# Patient Record
Sex: FEMALE | Race: WHITE | NOT HISPANIC OR LATINO
[De-identification: names, ages, dates, MRNs, and addresses within clinical notes are randomized per-mention and may not be internally consistent; named-entity substitution may affect disease eponyms.]

---

## 2018-10-04 ENCOUNTER — TRANSCRIPTION ENCOUNTER (OUTPATIENT)
Age: 33
End: 2018-10-04

## 2019-06-22 ENCOUNTER — TRANSCRIPTION ENCOUNTER (OUTPATIENT)
Age: 34
End: 2019-06-22

## 2019-09-12 PROBLEM — Z00.00 ENCOUNTER FOR PREVENTIVE HEALTH EXAMINATION: Status: ACTIVE | Noted: 2019-09-12

## 2019-09-17 ENCOUNTER — APPOINTMENT (OUTPATIENT)
Dept: MATERNAL FETAL MEDICINE | Facility: CLINIC | Age: 34
End: 2019-09-17
Payer: COMMERCIAL

## 2019-09-17 ENCOUNTER — ASOB RESULT (OUTPATIENT)
Age: 34
End: 2019-09-17

## 2019-09-17 DIAGNOSIS — O99.810 ABNORMAL GLUCOSE COMPLICATING PREGNANCY: ICD-10-CM

## 2019-09-17 PROCEDURE — G0109 DIAB MANAGE TRN IND/GROUP: CPT

## 2019-09-18 ENCOUNTER — OTHER (OUTPATIENT)
Age: 34
End: 2019-09-18

## 2019-09-18 ENCOUNTER — APPOINTMENT (OUTPATIENT)
Dept: MATERNAL FETAL MEDICINE | Facility: CLINIC | Age: 34
End: 2019-09-18
Payer: COMMERCIAL

## 2019-09-18 PROCEDURE — G0109 DIAB MANAGE TRN IND/GROUP: CPT

## 2019-09-18 RX ORDER — URINE ACETONE TEST STRIPS
STRIP MISCELLANEOUS
Qty: 1 | Refills: 1 | Status: ACTIVE | COMMUNITY
Start: 2019-09-17 | End: 1900-01-01

## 2019-09-18 RX ORDER — BLOOD-GLUCOSE METER
W/DEVICE KIT MISCELLANEOUS
Qty: 1 | Refills: 0 | Status: ACTIVE | COMMUNITY
Start: 2019-09-17 | End: 1900-01-01

## 2019-09-18 RX ORDER — LANCETS 33 GAUGE
EACH MISCELLANEOUS
Qty: 6 | Refills: 1 | Status: ACTIVE | COMMUNITY
Start: 2019-09-17 | End: 1900-01-01

## 2019-09-18 RX ORDER — BLOOD-GLUCOSE METER
KIT MISCELLANEOUS
Qty: 6 | Refills: 1 | Status: ACTIVE | COMMUNITY
Start: 2019-09-17 | End: 1900-01-01

## 2019-09-19 RX ORDER — BLOOD-GLUCOSE METER
W/DEVICE EACH MISCELLANEOUS
Qty: 1 | Refills: 0 | Status: ACTIVE | COMMUNITY
Start: 2019-09-19 | End: 1900-01-01

## 2019-09-19 RX ORDER — LANCETS
EACH MISCELLANEOUS
Qty: 2 | Refills: 3 | Status: COMPLETED | COMMUNITY
Start: 2019-09-19 | End: 2020-09-19

## 2019-09-19 RX ORDER — BLOOD SUGAR DIAGNOSTIC
STRIP MISCELLANEOUS
Qty: 2 | Refills: 3 | Status: COMPLETED | COMMUNITY
Start: 2019-09-19 | End: 2020-09-19

## 2019-09-19 RX ORDER — URINE GLUCOSE-ACET TEST STRIP
STRIP MISCELLANEOUS
Qty: 1 | Refills: 0 | Status: ACTIVE | COMMUNITY
Start: 2019-09-19 | End: 1900-01-01

## 2019-09-24 ENCOUNTER — ASOB RESULT (OUTPATIENT)
Age: 34
End: 2019-09-24

## 2019-09-24 ENCOUNTER — APPOINTMENT (OUTPATIENT)
Dept: MATERNAL FETAL MEDICINE | Facility: CLINIC | Age: 34
End: 2019-09-24
Payer: COMMERCIAL

## 2019-09-24 PROCEDURE — G0108 DIAB MANAGE TRN  PER INDIV: CPT

## 2019-10-02 ENCOUNTER — TRANSCRIPTION ENCOUNTER (OUTPATIENT)
Age: 34
End: 2019-10-02

## 2019-10-04 ENCOUNTER — APPOINTMENT (OUTPATIENT)
Dept: MATERNAL FETAL MEDICINE | Facility: CLINIC | Age: 34
End: 2019-10-04

## 2019-11-29 ENCOUNTER — TRANSCRIPTION ENCOUNTER (OUTPATIENT)
Age: 34
End: 2019-11-29

## 2019-12-05 ENCOUNTER — OUTPATIENT (OUTPATIENT)
Dept: OUTPATIENT SERVICES | Facility: HOSPITAL | Age: 34
LOS: 1 days | End: 2019-12-05
Payer: COMMERCIAL

## 2019-12-05 DIAGNOSIS — O34.219 MATERNAL CARE FOR UNSPECIFIED TYPE SCAR FROM PREVIOUS CESAREAN DELIVERY: ICD-10-CM

## 2019-12-05 DIAGNOSIS — Z01.818 ENCOUNTER FOR OTHER PREPROCEDURAL EXAMINATION: ICD-10-CM

## 2019-12-05 LAB
ANION GAP SERPL CALC-SCNC: 13 MMOL/L — SIGNIFICANT CHANGE UP (ref 5–17)
BLD GP AB SCN SERPL QL: NEGATIVE — SIGNIFICANT CHANGE UP
BUN SERPL-MCNC: 12 MG/DL — SIGNIFICANT CHANGE UP (ref 7–23)
CALCIUM SERPL-MCNC: 8.8 MG/DL — SIGNIFICANT CHANGE UP (ref 8.4–10.5)
CHLORIDE SERPL-SCNC: 100 MMOL/L — SIGNIFICANT CHANGE UP (ref 96–108)
CO2 SERPL-SCNC: 22 MMOL/L — SIGNIFICANT CHANGE UP (ref 22–31)
CREAT SERPL-MCNC: 0.76 MG/DL — SIGNIFICANT CHANGE UP (ref 0.5–1.3)
GLUCOSE SERPL-MCNC: 85 MG/DL — SIGNIFICANT CHANGE UP (ref 70–99)
HCT VFR BLD CALC: 34.1 % — LOW (ref 34.5–45)
HGB BLD-MCNC: 11.4 G/DL — LOW (ref 11.5–15.5)
MCHC RBC-ENTMCNC: 30.3 PG — SIGNIFICANT CHANGE UP (ref 27–34)
MCHC RBC-ENTMCNC: 33.4 GM/DL — SIGNIFICANT CHANGE UP (ref 32–36)
MCV RBC AUTO: 90.7 FL — SIGNIFICANT CHANGE UP (ref 80–100)
PLATELET # BLD AUTO: 287 K/UL — SIGNIFICANT CHANGE UP (ref 150–400)
POTASSIUM SERPL-MCNC: 4 MMOL/L — SIGNIFICANT CHANGE UP (ref 3.5–5.3)
POTASSIUM SERPL-SCNC: 4 MMOL/L — SIGNIFICANT CHANGE UP (ref 3.5–5.3)
RBC # BLD: 3.76 M/UL — LOW (ref 3.8–5.2)
RBC # FLD: 11.9 % — SIGNIFICANT CHANGE UP (ref 10.3–14.5)
RH IG SCN BLD-IMP: POSITIVE — SIGNIFICANT CHANGE UP
SODIUM SERPL-SCNC: 135 MMOL/L — SIGNIFICANT CHANGE UP (ref 135–145)
WBC # BLD: 9.71 K/UL — SIGNIFICANT CHANGE UP (ref 3.8–10.5)
WBC # FLD AUTO: 9.71 K/UL — SIGNIFICANT CHANGE UP (ref 3.8–10.5)

## 2019-12-05 PROCEDURE — 80048 BASIC METABOLIC PNL TOTAL CA: CPT

## 2019-12-05 PROCEDURE — 86900 BLOOD TYPING SEROLOGIC ABO: CPT

## 2019-12-05 PROCEDURE — 86901 BLOOD TYPING SEROLOGIC RH(D): CPT

## 2019-12-05 PROCEDURE — 86850 RBC ANTIBODY SCREEN: CPT

## 2019-12-05 PROCEDURE — 85027 COMPLETE CBC AUTOMATED: CPT

## 2019-12-05 PROCEDURE — G0463: CPT

## 2019-12-11 ENCOUNTER — TRANSCRIPTION ENCOUNTER (OUTPATIENT)
Age: 34
End: 2019-12-11

## 2019-12-12 ENCOUNTER — INPATIENT (INPATIENT)
Facility: HOSPITAL | Age: 34
LOS: 2 days | Discharge: HOME | End: 2019-12-15
Attending: OBSTETRICS & GYNECOLOGY | Admitting: OBSTETRICS & GYNECOLOGY
Payer: COMMERCIAL

## 2019-12-12 VITALS — WEIGHT: 147.71 LBS | HEIGHT: 66 IN

## 2019-12-12 DIAGNOSIS — O34.219 MATERNAL CARE FOR UNSPECIFIED TYPE SCAR FROM PREVIOUS CESAREAN DELIVERY: ICD-10-CM

## 2019-12-12 LAB
BLD GP AB SCN SERPL QL: NEGATIVE — SIGNIFICANT CHANGE UP
GLUCOSE BLDC GLUCOMTR-MCNC: 82 MG/DL — SIGNIFICANT CHANGE UP (ref 70–99)
RH IG SCN BLD-IMP: POSITIVE — SIGNIFICANT CHANGE UP
T PALLIDUM AB TITR SER: NEGATIVE — SIGNIFICANT CHANGE UP

## 2019-12-12 PROCEDURE — 59514 CESAREAN DELIVERY ONLY: CPT | Mod: AS,U9

## 2019-12-12 RX ORDER — OXYCODONE HYDROCHLORIDE 5 MG/1
5 TABLET ORAL ONCE
Refills: 0 | Status: DISCONTINUED | OUTPATIENT
Start: 2019-12-12 | End: 2019-12-15

## 2019-12-12 RX ORDER — FAMOTIDINE 10 MG/ML
20 INJECTION INTRAVENOUS ONCE
Refills: 0 | Status: COMPLETED | OUTPATIENT
Start: 2019-12-12 | End: 2019-12-12

## 2019-12-12 RX ORDER — ACETAMINOPHEN 500 MG
975 TABLET ORAL
Refills: 0 | Status: DISCONTINUED | OUTPATIENT
Start: 2019-12-12 | End: 2019-12-15

## 2019-12-12 RX ORDER — OXYTOCIN 10 UNIT/ML
333.33 VIAL (ML) INJECTION
Qty: 20 | Refills: 0 | Status: DISCONTINUED | OUTPATIENT
Start: 2019-12-12 | End: 2019-12-12

## 2019-12-12 RX ORDER — SODIUM CHLORIDE 9 MG/ML
1000 INJECTION, SOLUTION INTRAVENOUS
Refills: 0 | Status: DISCONTINUED | OUTPATIENT
Start: 2019-12-12 | End: 2019-12-15

## 2019-12-12 RX ORDER — OXYCODONE HYDROCHLORIDE 5 MG/1
5 TABLET ORAL
Refills: 0 | Status: DISCONTINUED | OUTPATIENT
Start: 2019-12-12 | End: 2019-12-15

## 2019-12-12 RX ORDER — DEXAMETHASONE 0.5 MG/5ML
4 ELIXIR ORAL EVERY 6 HOURS
Refills: 0 | Status: DISCONTINUED | OUTPATIENT
Start: 2019-12-12 | End: 2019-12-13

## 2019-12-12 RX ORDER — METOCLOPRAMIDE HCL 10 MG
10 TABLET ORAL ONCE
Refills: 0 | Status: DISCONTINUED | OUTPATIENT
Start: 2019-12-12 | End: 2019-12-12

## 2019-12-12 RX ORDER — NALOXONE HYDROCHLORIDE 4 MG/.1ML
0.1 SPRAY NASAL
Refills: 0 | Status: DISCONTINUED | OUTPATIENT
Start: 2019-12-12 | End: 2019-12-13

## 2019-12-12 RX ORDER — CHLORHEXIDINE GLUCONATE 213 G/1000ML
1 SOLUTION TOPICAL ONCE
Refills: 0 | Status: COMPLETED | OUTPATIENT
Start: 2019-12-12 | End: 2019-12-12

## 2019-12-12 RX ORDER — DIPHENHYDRAMINE HCL 50 MG
25 CAPSULE ORAL EVERY 6 HOURS
Refills: 0 | Status: DISCONTINUED | OUTPATIENT
Start: 2019-12-12 | End: 2019-12-15

## 2019-12-12 RX ORDER — MAGNESIUM HYDROXIDE 400 MG/1
30 TABLET, CHEWABLE ORAL
Refills: 0 | Status: DISCONTINUED | OUTPATIENT
Start: 2019-12-12 | End: 2019-12-15

## 2019-12-12 RX ORDER — TETANUS TOXOID, REDUCED DIPHTHERIA TOXOID AND ACELLULAR PERTUSSIS VACCINE, ADSORBED 5; 2.5; 8; 8; 2.5 [IU]/.5ML; [IU]/.5ML; UG/.5ML; UG/.5ML; UG/.5ML
0.5 SUSPENSION INTRAMUSCULAR ONCE
Refills: 0 | Status: DISCONTINUED | OUTPATIENT
Start: 2019-12-12 | End: 2019-12-15

## 2019-12-12 RX ORDER — ONDANSETRON 8 MG/1
4 TABLET, FILM COATED ORAL EVERY 6 HOURS
Refills: 0 | Status: DISCONTINUED | OUTPATIENT
Start: 2019-12-12 | End: 2019-12-13

## 2019-12-12 RX ORDER — FENTANYL/BUPIVACAINE/NS/PF 2MCG/ML-.1
5 PLASTIC BAG, INJECTION (ML) INJECTION
Refills: 0 | Status: DISCONTINUED | OUTPATIENT
Start: 2019-12-12 | End: 2019-12-13

## 2019-12-12 RX ORDER — CITRIC ACID/SODIUM CITRATE 300-500 MG
15 SOLUTION, ORAL ORAL ONCE
Refills: 0 | Status: COMPLETED | OUTPATIENT
Start: 2019-12-12 | End: 2019-12-12

## 2019-12-12 RX ORDER — FENTANYL/BUPIVACAINE/NS/PF 2MCG/ML-.1
250 PLASTIC BAG, INJECTION (ML) INJECTION
Refills: 0 | Status: DISCONTINUED | OUTPATIENT
Start: 2019-12-12 | End: 2019-12-13

## 2019-12-12 RX ORDER — SIMETHICONE 80 MG/1
80 TABLET, CHEWABLE ORAL EVERY 4 HOURS
Refills: 0 | Status: DISCONTINUED | OUTPATIENT
Start: 2019-12-12 | End: 2019-12-15

## 2019-12-12 RX ORDER — SODIUM CHLORIDE 9 MG/ML
1000 INJECTION, SOLUTION INTRAVENOUS ONCE
Refills: 0 | Status: COMPLETED | OUTPATIENT
Start: 2019-12-12 | End: 2019-12-12

## 2019-12-12 RX ORDER — KETOROLAC TROMETHAMINE 30 MG/ML
30 SYRINGE (ML) INJECTION EVERY 6 HOURS
Refills: 0 | Status: DISCONTINUED | OUTPATIENT
Start: 2019-12-12 | End: 2019-12-14

## 2019-12-12 RX ORDER — OXYTOCIN 10 UNIT/ML
333.33 VIAL (ML) INJECTION
Qty: 20 | Refills: 0 | Status: DISCONTINUED | OUTPATIENT
Start: 2019-12-12 | End: 2019-12-15

## 2019-12-12 RX ORDER — CEFAZOLIN SODIUM 1 G
2000 VIAL (EA) INJECTION ONCE
Refills: 0 | Status: DISCONTINUED | OUTPATIENT
Start: 2019-12-12 | End: 2019-12-12

## 2019-12-12 RX ORDER — GLYCERIN ADULT
1 SUPPOSITORY, RECTAL RECTAL AT BEDTIME
Refills: 0 | Status: DISCONTINUED | OUTPATIENT
Start: 2019-12-12 | End: 2019-12-15

## 2019-12-12 RX ORDER — LANOLIN
1 OINTMENT (GRAM) TOPICAL EVERY 6 HOURS
Refills: 0 | Status: DISCONTINUED | OUTPATIENT
Start: 2019-12-12 | End: 2019-12-15

## 2019-12-12 RX ORDER — IBUPROFEN 200 MG
600 TABLET ORAL EVERY 6 HOURS
Refills: 0 | Status: COMPLETED | OUTPATIENT
Start: 2019-12-12 | End: 2020-11-09

## 2019-12-12 RX ORDER — HEPARIN SODIUM 5000 [USP'U]/ML
5000 INJECTION INTRAVENOUS; SUBCUTANEOUS EVERY 12 HOURS
Refills: 0 | Status: DISCONTINUED | OUTPATIENT
Start: 2019-12-12 | End: 2019-12-15

## 2019-12-12 RX ORDER — SODIUM CHLORIDE 9 MG/ML
1000 INJECTION, SOLUTION INTRAVENOUS
Refills: 0 | Status: DISCONTINUED | OUTPATIENT
Start: 2019-12-12 | End: 2019-12-12

## 2019-12-12 RX ADMIN — Medication 1000 MILLIUNIT(S)/MIN: at 14:30

## 2019-12-12 RX ADMIN — Medication 250 MILLILITER(S): at 16:05

## 2019-12-12 RX ADMIN — CHLORHEXIDINE GLUCONATE 1 APPLICATION(S): 213 SOLUTION TOPICAL at 10:00

## 2019-12-12 RX ADMIN — Medication 975 MILLIGRAM(S): at 21:36

## 2019-12-12 RX ADMIN — Medication 975 MILLIGRAM(S): at 22:28

## 2019-12-12 RX ADMIN — Medication 30 MILLIGRAM(S): at 12:45

## 2019-12-12 RX ADMIN — HEPARIN SODIUM 5000 UNIT(S): 5000 INJECTION INTRAVENOUS; SUBCUTANEOUS at 18:51

## 2019-12-12 RX ADMIN — FAMOTIDINE 20 MILLIGRAM(S): 10 INJECTION INTRAVENOUS at 11:09

## 2019-12-12 RX ADMIN — Medication 30 MILLIGRAM(S): at 18:51

## 2019-12-12 RX ADMIN — SODIUM CHLORIDE 2000 MILLILITER(S): 9 INJECTION, SOLUTION INTRAVENOUS at 10:20

## 2019-12-12 RX ADMIN — Medication 250 MILLILITER(S): at 13:33

## 2019-12-12 RX ADMIN — Medication 250 MILLILITER(S): at 19:18

## 2019-12-12 RX ADMIN — SODIUM CHLORIDE 125 MILLILITER(S): 9 INJECTION, SOLUTION INTRAVENOUS at 14:31

## 2019-12-12 RX ADMIN — Medication 100 MILLIGRAM(S): at 11:51

## 2019-12-12 RX ADMIN — Medication 15 MILLILITER(S): at 11:09

## 2019-12-12 NOTE — PRE-ANESTHESIA EVALUATION ADULT - NSANTHPMHFT_GEN_ALL_CORE
initial  due to placenta previa. +Gestational DM on insulin 10u qHS (Took 8u last nightl; FSG-82). No significant PMHx.

## 2019-12-12 NOTE — PRE-ANESTHESIA EVALUATION ADULT - NSANTHOSAYNRD_GEN_A_CORE
No. [place] screening performed.  STOP BANG Legend: 0-2 = LOW Risk; 3-4 = INTERMEDIATE Risk; 5-8 = HIGH Risk

## 2019-12-13 LAB
BASOPHILS # BLD AUTO: 0.04 K/UL — SIGNIFICANT CHANGE UP (ref 0–0.2)
BASOPHILS NFR BLD AUTO: 0.3 % — SIGNIFICANT CHANGE UP (ref 0–2)
EOSINOPHIL # BLD AUTO: 0.02 K/UL — SIGNIFICANT CHANGE UP (ref 0–0.5)
EOSINOPHIL NFR BLD AUTO: 0.2 % — SIGNIFICANT CHANGE UP (ref 0–6)
HCT VFR BLD CALC: 26.3 % — LOW (ref 34.5–45)
HGB BLD-MCNC: 8.3 G/DL — LOW (ref 11.5–15.5)
IMM GRANULOCYTES NFR BLD AUTO: 0.5 % — SIGNIFICANT CHANGE UP (ref 0–1.5)
LYMPHOCYTES # BLD AUTO: 18.8 % — SIGNIFICANT CHANGE UP (ref 13–44)
LYMPHOCYTES # BLD AUTO: 2.42 K/UL — SIGNIFICANT CHANGE UP (ref 1–3.3)
MCHC RBC-ENTMCNC: 29 PG — SIGNIFICANT CHANGE UP (ref 27–34)
MCHC RBC-ENTMCNC: 31.6 GM/DL — LOW (ref 32–36)
MCV RBC AUTO: 92 FL — SIGNIFICANT CHANGE UP (ref 80–100)
MONOCYTES # BLD AUTO: 0.95 K/UL — HIGH (ref 0–0.9)
MONOCYTES NFR BLD AUTO: 7.4 % — SIGNIFICANT CHANGE UP (ref 2–14)
NEUTROPHILS # BLD AUTO: 9.34 K/UL — HIGH (ref 1.8–7.4)
NEUTROPHILS NFR BLD AUTO: 72.8 % — SIGNIFICANT CHANGE UP (ref 43–77)
PLATELET # BLD AUTO: 243 K/UL — SIGNIFICANT CHANGE UP (ref 150–400)
RBC # BLD: 2.86 M/UL — LOW (ref 3.8–5.2)
RBC # FLD: 12.1 % — SIGNIFICANT CHANGE UP (ref 10.3–14.5)
WBC # BLD: 12.84 K/UL — HIGH (ref 3.8–10.5)
WBC # FLD AUTO: 12.84 K/UL — HIGH (ref 3.8–10.5)

## 2019-12-13 RX ORDER — ASCORBIC ACID 60 MG
500 TABLET,CHEWABLE ORAL THREE TIMES A DAY
Refills: 0 | Status: DISCONTINUED | OUTPATIENT
Start: 2019-12-13 | End: 2019-12-15

## 2019-12-13 RX ORDER — ONDANSETRON 8 MG/1
4 TABLET, FILM COATED ORAL ONCE
Refills: 0 | Status: COMPLETED | OUTPATIENT
Start: 2019-12-13 | End: 2019-12-13

## 2019-12-13 RX ORDER — FERROUS SULFATE 325(65) MG
325 TABLET ORAL THREE TIMES A DAY
Refills: 0 | Status: DISCONTINUED | OUTPATIENT
Start: 2019-12-13 | End: 2019-12-15

## 2019-12-13 RX ADMIN — Medication 30 MILLIGRAM(S): at 01:10

## 2019-12-13 RX ADMIN — Medication 30 MILLIGRAM(S): at 12:30

## 2019-12-13 RX ADMIN — Medication 975 MILLIGRAM(S): at 09:02

## 2019-12-13 RX ADMIN — Medication 975 MILLIGRAM(S): at 20:03

## 2019-12-13 RX ADMIN — Medication 975 MILLIGRAM(S): at 03:07

## 2019-12-13 RX ADMIN — Medication 30 MILLIGRAM(S): at 12:00

## 2019-12-13 RX ADMIN — HEPARIN SODIUM 5000 UNIT(S): 5000 INJECTION INTRAVENOUS; SUBCUTANEOUS at 18:07

## 2019-12-13 RX ADMIN — Medication 30 MILLIGRAM(S): at 05:45

## 2019-12-13 RX ADMIN — Medication 250 MILLILITER(S): at 05:22

## 2019-12-13 RX ADMIN — Medication 500 MILLIGRAM(S): at 20:03

## 2019-12-13 RX ADMIN — Medication 500 MILLIGRAM(S): at 12:00

## 2019-12-13 RX ADMIN — Medication 30 MILLIGRAM(S): at 23:00

## 2019-12-13 RX ADMIN — Medication 30 MILLIGRAM(S): at 23:20

## 2019-12-13 RX ADMIN — Medication 975 MILLIGRAM(S): at 09:35

## 2019-12-13 RX ADMIN — Medication 30 MILLIGRAM(S): at 18:07

## 2019-12-13 RX ADMIN — Medication 325 MILLIGRAM(S): at 20:03

## 2019-12-13 RX ADMIN — ONDANSETRON 4 MILLIGRAM(S): 8 TABLET, FILM COATED ORAL at 14:39

## 2019-12-13 RX ADMIN — Medication 975 MILLIGRAM(S): at 21:00

## 2019-12-13 RX ADMIN — HEPARIN SODIUM 5000 UNIT(S): 5000 INJECTION INTRAVENOUS; SUBCUTANEOUS at 05:21

## 2019-12-13 RX ADMIN — Medication 30 MILLIGRAM(S): at 00:47

## 2019-12-13 RX ADMIN — Medication 30 MILLIGRAM(S): at 05:21

## 2019-12-13 RX ADMIN — Medication 325 MILLIGRAM(S): at 12:00

## 2019-12-13 RX ADMIN — Medication 975 MILLIGRAM(S): at 04:00

## 2019-12-13 RX ADMIN — Medication 30 MILLIGRAM(S): at 18:30

## 2019-12-13 NOTE — CHART NOTE - NSCHARTNOTEFT_GEN_A_CORE
PA NOTE       POD#1         Vital Signs Last 24 Hrs  T(C): 36.7 (13 Dec 2019 05:30), Max: 36.8 (12 Dec 2019 17:05)  T(F): 98.1 (13 Dec 2019 05:30), Max: 98.3 (12 Dec 2019 17:05)  HR: 64 (13 Dec 2019 05:30) (48 - 78)  BP: 111/66 (13 Dec 2019 05:30) (102/61 - 126/77)  BP(mean): 76 (12 Dec 2019 13:44) (76 - 84)  RR: 18 (13 Dec 2019 05:30) (14 - 24)  SpO2: 96% (13 Dec 2019 05:30) (96% - 100%)               8.3    12.84 )-----------( 243      ( 12-13 @ 08:31 )             26.3           Assessment: Anemia secondary to acute blood loss due to delivery    Plan:  - Ferrous Sulfate, Vitamin C supplementation.  - Monitor for signs/symptoms of anemia.   - Does not require transfusion at this time

## 2019-12-13 NOTE — PROGRESS NOTE ADULT - SUBJECTIVE AND OBJECTIVE BOX
Day 1 of Anesthesia Pain Management Service    SUBJECTIVE: I'm okay  Pain Scale Score:    [X] Refer to charted pain scores    THERAPY: Epidural Bupivacaine 0.01 % and Fentanyl 3 micrograms/mL     Demand Dose: 3 mL  Lockout: 15 minutes   Continuous Rate:  10 mL    MEDICATIONS  (STANDING):  acetaminophen   Tablet .. 975 milliGRAM(s) Oral <User Schedule>  ascorbic acid 500 milliGRAM(s) Oral three times a day  diphtheria/tetanus/pertussis (acellular) Vaccine (ADAcel) 0.5 milliLiter(s) IntraMuscular once  fentanyl (3 MICROgram(s)/mL) + bupivacaine 0.01% in 0.9% Sodium Chloride PCEA 250 milliLiter(s) Epidural PCA Continuous  ferrous    sulfate 325 milliGRAM(s) Oral three times a day  heparin  Injectable 5000 Unit(s) SubCutaneous every 12 hours  ibuprofen  Tablet. 600 milliGRAM(s) Oral every 6 hours  ketorolac   Injectable 30 milliGRAM(s) IV Push every 6 hours  lactated ringers. 1000 milliLiter(s) (125 mL/Hr) IV Continuous <Continuous>  oxytocin Infusion 333.333 milliUNIT(s)/Min (1000 mL/Hr) IV Continuous <Continuous>    MEDICATIONS  (PRN):  dexAMETHasone  Injectable 4 milliGRAM(s) IV Push every 6 hours PRN Nausea  diphenhydrAMINE 25 milliGRAM(s) Oral every 6 hours PRN Itching  fentanyl (3 MICROgram(s)/mL) + bupivacaine 0.01% in 0.9% Sodium Chloride PCEA Rescue Clinician Bolus 5 milliLiter(s) Epidural every 15 minutes PRN Moderate Pain (4 - 6)  glycerin Suppository - Adult 1 Suppository(s) Rectal at bedtime PRN Constipation  lanolin Ointment 1 Application(s) Topical every 6 hours PRN Sore Nipples  magnesium hydroxide Suspension 30 milliLiter(s) Oral two times a day PRN Constipation  naloxone Injectable 0.1 milliGRAM(s) IV Push every 3 minutes PRN For ANY of the following changes in patient status:  A. RR LESS THAN 10 breaths per minute, B. Oxygen saturation LESS THAN 90%, C. Sedation score of 6  ondansetron Injectable 4 milliGRAM(s) IV Push every 6 hours PRN Nausea  oxyCODONE    IR 5 milliGRAM(s) Oral every 3 hours PRN Moderate to Severe Pain (4-10)  oxyCODONE    IR 5 milliGRAM(s) Oral once PRN Moderate to Severe Pain (4-10)  simethicone 80 milliGRAM(s) Chew every 4 hours PRN Gas      OBJECTIVE:    Assessment of Epidural Catheter Site: 	    [X] Dressing intact	[X] Site non-tender	[X] Site without erythema, discharge, edema  [X] Epidural tubing and connection checked	[X] Gross neurological exam within normal limits  [ ] Catheter removed – tip intact		                          8.3    12.84 )-----------( 243      ( 13 Dec 2019 08:31 )             26.3     Vital Signs Last 24 Hrs  T(C): 36.7 (12-13-19 @ 09:00), Max: 36.8 (12-12-19 @ 17:05)  T(F): 98.1 (12-13-19 @ 09:00), Max: 98.3 (12-12-19 @ 17:05)  HR: 58 (12-13-19 @ 09:00) (48 - 78)  BP: 103/67 (12-13-19 @ 09:00) (102/61 - 126/77)  BP(mean): 76 (12-12-19 @ 13:44) (76 - 84)  RR: 18 (12-13-19 @ 09:00) (14 - 24)  SpO2: 98% (12-13-19 @ 09:00) (96% - 100%)      Sedation Score:	[X] Alert	[ ] Drowsy	[ ] Arousable  [ ] Asleep  [ ] Unresponsive    Side Effects:	[X] None	[ ] Nausea	[ ] Vomiting  [ ] Pruritus  		[ ] Weakness  [ ] Numbness  [ ] Other:    ASSESSMENT/ PLAN:    Therapy:                         [X] Continue   [ ] Discontinue   [ ] Change to PRN Analgesics    Documentation and Verification of current medications:  [X] Done	[ ] Not done, not eligible, reason:    Comments: Pain controlled with PCEA. Plan to D\C PCEA later today. Anticoagulation status reviewed.

## 2019-12-13 NOTE — PROGRESS NOTE ADULT - SUBJECTIVE AND OBJECTIVE BOX
OB Progress Note:  Delivery, POD#1    S: 33yo POD#1 s/p LTCS . Her pain is well controlled. She is tolerating a regular diet, has not yet passed flatus. Denies N/V. Denies CP/SOB/lightheadedness/dizziness.   She is ambulating without difficulty.   Voiding spontaneously.  Denies heavy vaginal bleeding.    O:   Vital Signs Last 24 Hrs  T(C): 36.7 (13 Dec 2019 05:30), Max: 36.8 (12 Dec 2019 17:05)  T(F): 98.1 (13 Dec 2019 05:30), Max: 98.3 (12 Dec 2019 17:05)  HR: 64 (13 Dec 2019 05:30) (48 - 78)  BP: 111/66 (13 Dec 2019 05:30) (102/61 - 126/77)  BP(mean): 76 (12 Dec 2019 13:44) (76 - 84)  RR: 18 (13 Dec 2019 05:30) (14 - 24)  SpO2: 96% (13 Dec 2019 05:30) (96% - 100%)    Labs:  Blood type: B Positive  Rubella IgG: RPR: Negative                    PE:  General: NAD  Abdomen: Mildly distended, appropriately tender, Fundus firm, incision c/d/i. Staples in place.   VE: No heavy vaginal bleeding  Extremities: No erythema, no pitting edema

## 2019-12-13 NOTE — PROGRESS NOTE ADULT - PROBLEM SELECTOR PLAN 1
- Continue regular diet.  - Increase ambulation.  - continue PCEA for pain.  - F/u AM CBC    Robyn Frankel PGY-1

## 2019-12-14 RX ORDER — IBUPROFEN 200 MG
600 TABLET ORAL EVERY 6 HOURS
Refills: 0 | Status: DISCONTINUED | OUTPATIENT
Start: 2019-12-14 | End: 2019-12-15

## 2019-12-14 RX ADMIN — Medication 975 MILLIGRAM(S): at 21:17

## 2019-12-14 RX ADMIN — Medication 975 MILLIGRAM(S): at 03:57

## 2019-12-14 RX ADMIN — Medication 975 MILLIGRAM(S): at 09:45

## 2019-12-14 RX ADMIN — Medication 325 MILLIGRAM(S): at 05:19

## 2019-12-14 RX ADMIN — Medication 975 MILLIGRAM(S): at 15:39

## 2019-12-14 RX ADMIN — Medication 500 MILLIGRAM(S): at 21:18

## 2019-12-14 RX ADMIN — HEPARIN SODIUM 5000 UNIT(S): 5000 INJECTION INTRAVENOUS; SUBCUTANEOUS at 05:19

## 2019-12-14 RX ADMIN — Medication 975 MILLIGRAM(S): at 04:45

## 2019-12-14 RX ADMIN — Medication 30 MILLIGRAM(S): at 05:36

## 2019-12-14 RX ADMIN — Medication 975 MILLIGRAM(S): at 09:08

## 2019-12-14 RX ADMIN — Medication 600 MILLIGRAM(S): at 11:27

## 2019-12-14 RX ADMIN — Medication 500 MILLIGRAM(S): at 15:39

## 2019-12-14 RX ADMIN — Medication 975 MILLIGRAM(S): at 16:00

## 2019-12-14 RX ADMIN — HEPARIN SODIUM 5000 UNIT(S): 5000 INJECTION INTRAVENOUS; SUBCUTANEOUS at 17:13

## 2019-12-14 RX ADMIN — Medication 975 MILLIGRAM(S): at 21:47

## 2019-12-14 RX ADMIN — Medication 600 MILLIGRAM(S): at 11:50

## 2019-12-14 RX ADMIN — Medication 500 MILLIGRAM(S): at 05:19

## 2019-12-14 RX ADMIN — Medication 600 MILLIGRAM(S): at 17:12

## 2019-12-14 RX ADMIN — Medication 30 MILLIGRAM(S): at 05:19

## 2019-12-14 NOTE — PROGRESS NOTE ADULT - PROBLEM SELECTOR PLAN 1
- Continue regular diet.  - Increase ambulation.  - Continue motrin, tylenol, oxycodone PRN for pain control.  - Staple in place at incision line, to be removed prior to discharge    Karen Vela, PGY-1

## 2019-12-14 NOTE — DIETITIAN INITIAL EVALUATION ADULT. - OTHER INFO
Pt reports good appetite and PO intake at home. Confirms NKFA. Reports following a Consistent Carbohydrate with GDM pattern diet at home, with use of insulin. Reports taking pre- Multivitamin PTA.     Pt  s/p rLTCS day#2. Pt antepartum course significant for GDM reports good glycemic control. Pt reports good appetite and PO intake, tolerating regular diet. Denies GI distress. Pt with plans to breastfeed baby.     Provided education on GDM follow up including 2-hr GTT in 4-12 weeks. Educated on risk to develop T2DM and strategies to decrease risk. Recommended resume regular diet with mindful eating and physical activity. Provided education on increased kcal and protein needs with breastfeeding. Recommended continue pre- Multivitamin for breastfeeding needs. Provided handout with education. Pt amenable for education.

## 2019-12-14 NOTE — PROGRESS NOTE ADULT - SUBJECTIVE AND OBJECTIVE BOX
OB Postpartum Note: Repeat  Delivery, POD#2    S: 35yo POD#2 s/p rLTCS. Her pain is well controlled. She is tolerating a regular diet and passing flatus. Voiding spontaneously. Denies N/V. Denies CP/SOB/lightheadedness/dizziness.     O:   Vitals:  Vital Signs Last 24 Hrs  T(C): 36.6 (14 Dec 2019 05:25), Max: 36.8 (13 Dec 2019 13:00)  T(F): 97.9 (14 Dec 2019 05:25), Max: 98.2 (13 Dec 2019 13:00)  HR: 66 (14 Dec 2019 05:25) (58 - 80)  BP: 99/66 (14 Dec 2019 05:25) (96/64 - 113/74)  BP(mean): --  RR: 18 (14 Dec 2019 05:25) (18 - 18)  SpO2: 97% (14 Dec 2019 05:25) (96% - 98%)    MEDICATIONS  (STANDING):  acetaminophen   Tablet .. 975 milliGRAM(s) Oral <User Schedule>  ascorbic acid 500 milliGRAM(s) Oral three times a day  diphtheria/tetanus/pertussis (acellular) Vaccine (ADAcel) 0.5 milliLiter(s) IntraMuscular once  ferrous    sulfate 325 milliGRAM(s) Oral three times a day  heparin  Injectable 5000 Unit(s) SubCutaneous every 12 hours  ibuprofen  Tablet. 600 milliGRAM(s) Oral every 6 hours  lactated ringers. 1000 milliLiter(s) (125 mL/Hr) IV Continuous <Continuous>  oxytocin Infusion 333.333 milliUNIT(s)/Min (1000 mL/Hr) IV Continuous <Continuous>    MEDICATIONS  (PRN):  diphenhydrAMINE 25 milliGRAM(s) Oral every 6 hours PRN Itching  glycerin Suppository - Adult 1 Suppository(s) Rectal at bedtime PRN Constipation  lanolin Ointment 1 Application(s) Topical every 6 hours PRN Sore Nipples  magnesium hydroxide Suspension 30 milliLiter(s) Oral two times a day PRN Constipation  oxyCODONE    IR 5 milliGRAM(s) Oral every 3 hours PRN Moderate to Severe Pain (4-10)  oxyCODONE    IR 5 milliGRAM(s) Oral once PRN Moderate to Severe Pain (4-10)  simethicone 80 milliGRAM(s) Chew every 4 hours PRN Gas      Labs:  Blood type: B Positive  Rubella IgG: RPR: Negative                          8.3<L>   12.84<H> >-----------< 243    (  @ 08:31 )             26.3<L>      PE:  General: NAD  Abdomen: mildly distended,appropriately tender, incision c/d/i.  Extremities: SCDs in place, no erythema

## 2019-12-14 NOTE — DIETITIAN INITIAL EVALUATION ADULT. - ADD RECOMMEND
1) Continue regular diet, pt is tolerating well. 2) GDM post-partum education provided, with written education, Pt aware RD remains available for any concerns, questions or diet preferences

## 2019-12-15 ENCOUNTER — TRANSCRIPTION ENCOUNTER (OUTPATIENT)
Age: 34
End: 2019-12-15

## 2019-12-15 VITALS
RESPIRATION RATE: 18 BRPM | DIASTOLIC BLOOD PRESSURE: 70 MMHG | SYSTOLIC BLOOD PRESSURE: 105 MMHG | TEMPERATURE: 98 F | HEART RATE: 60 BPM | OXYGEN SATURATION: 97 %

## 2019-12-15 LAB
HCT VFR BLD CALC: 26.9 % — LOW (ref 34.5–45)
HGB BLD-MCNC: 8.3 G/DL — LOW (ref 11.5–15.5)
MCHC RBC-ENTMCNC: 28.9 PG — SIGNIFICANT CHANGE UP (ref 27–34)
MCHC RBC-ENTMCNC: 30.9 GM/DL — LOW (ref 32–36)
MCV RBC AUTO: 93.7 FL — SIGNIFICANT CHANGE UP (ref 80–100)
NRBC # BLD: 0 /100 WBCS — SIGNIFICANT CHANGE UP (ref 0–0)
PLATELET # BLD AUTO: 271 K/UL — SIGNIFICANT CHANGE UP (ref 150–400)
RBC # BLD: 2.87 M/UL — LOW (ref 3.8–5.2)
RBC # FLD: 12.3 % — SIGNIFICANT CHANGE UP (ref 10.3–14.5)
WBC # BLD: 8.47 K/UL — SIGNIFICANT CHANGE UP (ref 3.8–10.5)
WBC # FLD AUTO: 8.47 K/UL — SIGNIFICANT CHANGE UP (ref 3.8–10.5)

## 2019-12-15 PROCEDURE — 59050 FETAL MONITOR W/REPORT: CPT

## 2019-12-15 PROCEDURE — 86901 BLOOD TYPING SEROLOGIC RH(D): CPT

## 2019-12-15 PROCEDURE — 82962 GLUCOSE BLOOD TEST: CPT

## 2019-12-15 PROCEDURE — 86850 RBC ANTIBODY SCREEN: CPT

## 2019-12-15 PROCEDURE — C1889: CPT

## 2019-12-15 PROCEDURE — 59025 FETAL NON-STRESS TEST: CPT

## 2019-12-15 PROCEDURE — 86780 TREPONEMA PALLIDUM: CPT

## 2019-12-15 PROCEDURE — 85027 COMPLETE CBC AUTOMATED: CPT

## 2019-12-15 PROCEDURE — 86900 BLOOD TYPING SEROLOGIC ABO: CPT

## 2019-12-15 RX ORDER — ACETAMINOPHEN 500 MG
3 TABLET ORAL
Qty: 0 | Refills: 0 | DISCHARGE
Start: 2019-12-15

## 2019-12-15 RX ORDER — HUMAN INSULIN 100 [IU]/ML
10 INJECTION, SUSPENSION SUBCUTANEOUS
Qty: 0 | Refills: 0 | DISCHARGE

## 2019-12-15 RX ORDER — IBUPROFEN 200 MG
1 TABLET ORAL
Qty: 0 | Refills: 0 | DISCHARGE
Start: 2019-12-15

## 2019-12-15 RX ADMIN — Medication 600 MILLIGRAM(S): at 12:11

## 2019-12-15 RX ADMIN — Medication 600 MILLIGRAM(S): at 01:03

## 2019-12-15 RX ADMIN — Medication 500 MILLIGRAM(S): at 05:59

## 2019-12-15 RX ADMIN — HEPARIN SODIUM 5000 UNIT(S): 5000 INJECTION INTRAVENOUS; SUBCUTANEOUS at 06:00

## 2019-12-15 RX ADMIN — Medication 600 MILLIGRAM(S): at 00:33

## 2019-12-15 RX ADMIN — Medication 600 MILLIGRAM(S): at 06:00

## 2019-12-15 RX ADMIN — SIMETHICONE 80 MILLIGRAM(S): 80 TABLET, CHEWABLE ORAL at 05:59

## 2019-12-15 RX ADMIN — Medication 600 MILLIGRAM(S): at 06:30

## 2019-12-15 RX ADMIN — Medication 975 MILLIGRAM(S): at 08:34

## 2019-12-15 RX ADMIN — Medication 975 MILLIGRAM(S): at 03:51

## 2019-12-15 RX ADMIN — Medication 975 MILLIGRAM(S): at 09:30

## 2019-12-15 RX ADMIN — Medication 975 MILLIGRAM(S): at 04:21

## 2019-12-15 NOTE — PROGRESS NOTE ADULT - PROBLEM SELECTOR PLAN 1
- Continue regular diet.  - Increase ambulation.  - Continue motrin, tylenol, oxycodone PRN for pain control.  - Staples removed  - Discharge planning    Karen Vela, PGY-1

## 2019-12-15 NOTE — PROGRESS NOTE ADULT - SUBJECTIVE AND OBJECTIVE BOX
OB Postpartum Note: Repeat  Delivery, POD#3    S: 35yo POD#3 s/p rLTCS. Her pain is well controlled. She is tolerating a regular diet and passing flatus. Voiding spontaneously and ambulating without difficulty. Denies N/V. Denies CP/SOB/lightheadedness/dizziness.     O:   Vitals:  Vital Signs Last 24 Hrs  T(C): 36.7 (15 Dec 2019 05:57), Max: 36.9 (14 Dec 2019 13:30)  T(F): 98.1 (15 Dec 2019 05:57), Max: 98.5 (14 Dec 2019 13:30)  HR: 62 (15 Dec 2019 05:57) (62 - 71)  BP: 114/76 (15 Dec 2019 05:57) (110/60 - 118/78)  BP(mean): --  RR: 18 (15 Dec 2019 05:57) (18 - 18)  SpO2: 98% (15 Dec 2019 05:57) (98% - 98%)    MEDICATIONS  (STANDING):  acetaminophen   Tablet .. 975 milliGRAM(s) Oral <User Schedule>  ascorbic acid 500 milliGRAM(s) Oral three times a day  diphtheria/tetanus/pertussis (acellular) Vaccine (ADAcel) 0.5 milliLiter(s) IntraMuscular once  ferrous    sulfate 325 milliGRAM(s) Oral three times a day  heparin  Injectable 5000 Unit(s) SubCutaneous every 12 hours  ibuprofen  Tablet. 600 milliGRAM(s) Oral every 6 hours  lactated ringers. 1000 milliLiter(s) (125 mL/Hr) IV Continuous <Continuous>  oxytocin Infusion 333.333 milliUNIT(s)/Min (1000 mL/Hr) IV Continuous <Continuous>    MEDICATIONS  (PRN):  diphenhydrAMINE 25 milliGRAM(s) Oral every 6 hours PRN Itching  glycerin Suppository - Adult 1 Suppository(s) Rectal at bedtime PRN Constipation  lanolin Ointment 1 Application(s) Topical every 6 hours PRN Sore Nipples  magnesium hydroxide Suspension 30 milliLiter(s) Oral two times a day PRN Constipation  oxyCODONE    IR 5 milliGRAM(s) Oral every 3 hours PRN Moderate to Severe Pain (4-10)  oxyCODONE    IR 5 milliGRAM(s) Oral once PRN Moderate to Severe Pain (4-10)  simethicone 80 milliGRAM(s) Chew every 4 hours PRN Gas      Labs:  Blood type: B Positive  Rubella IgG: RPR: Negative                          8.3<L>   12.84<H> >-----------< 243    ( 13 @ 08:31 )             26.3<L>    PE:  General: NAD  Abdomen: mildly distended,appropriately tender, incision c/d/i.  Extremities: SCDs in place, no erythema

## 2019-12-15 NOTE — DISCHARGE NOTE OB - MEDICATION SUMMARY - MEDICATIONS TO TAKE
I will START or STAY ON the medications listed below when I get home from the hospital:    Actamin 325 mg oral tablet  -- 3 tab(s) by mouth   -- Indication: For  delivery delivered    ibuprofen 600 mg oral tablet  -- 1 tab(s) by mouth every 6 hours  -- Indication: For  delivery delivered    Prenatal Multivitamins oral tablet  -- 1 tab(s) by mouth once a day  -- Indication: For  delivery delivered

## 2019-12-15 NOTE — DISCHARGE NOTE OB - CARE PROVIDER_API CALL
Angela Mortensen)  Obstetrics and Gynecology  [address] [place] [address]  Phone: [phone]  Fax: [phone]  Follow Up Time:

## 2019-12-15 NOTE — DISCHARGE NOTE OB - HOSPITAL COURSE
see hospital record for detailed account   pt ambulating, liliya regular diet and ready for dc home   anemia cw pp state from blood loss from surgery

## 2019-12-15 NOTE — DISCHARGE NOTE OB - PATIENT PORTAL LINK FT
You can access the FollowMyHealth Patient Portal offered by [facility] by registering at the following website: [email or web address]. By joining [facility]’s FollowMyHealth portal, you will also be able to view your health information using other applications (apps) compatible with our system.

## 2019-12-15 NOTE — DISCHARGE NOTE OB - CARE PLAN
Principal Discharge DX:	 delivery delivered  Goal:	return to prenatal state  Assessment and plan of treatment:	pelvic rest, limit activity, regular diet

## 2023-01-03 ENCOUNTER — NON-APPOINTMENT (OUTPATIENT)
Age: 38
End: 2023-01-03

## 2023-01-23 ENCOUNTER — APPOINTMENT (OUTPATIENT)
Dept: SURGERY | Facility: CLINIC | Age: 38
End: 2023-01-23
Payer: COMMERCIAL

## 2023-01-23 VITALS — HEIGHT: 66 IN | BODY MASS INDEX: 20.09 KG/M2 | WEIGHT: 125 LBS | TEMPERATURE: 98.2 F

## 2023-01-23 VITALS — HEART RATE: 67 BPM | SYSTOLIC BLOOD PRESSURE: 109 MMHG | DIASTOLIC BLOOD PRESSURE: 76 MMHG

## 2023-01-23 DIAGNOSIS — K42.9 UMBILICAL HERNIA W/OUT OBSTRUCTION OR GANGRENE: ICD-10-CM

## 2023-01-23 DIAGNOSIS — Z78.9 OTHER SPECIFIED HEALTH STATUS: ICD-10-CM

## 2023-01-23 DIAGNOSIS — Z83.49 FAMILY HISTORY OF OTHER ENDOCRINE, NUTRITIONAL AND METABOLIC DISEASES: ICD-10-CM

## 2023-01-23 PROCEDURE — 99203 OFFICE O/P NEW LOW 30 MIN: CPT

## 2023-01-23 NOTE — ASSESSMENT
[FreeTextEntry1] : 36 yo female with a symptomatic umbilical hernia. To schedule out patient open repair.

## 2023-01-23 NOTE — HISTORY OF PRESENT ILLNESS
[de-identified] : 36 yo healthy female with h/o  x 2 presents for evaluation of an intermittently tender umbilical bulge. patient states she developed bulge after her second pregnancy. She denies any nausea or vomiting. She states she developed significant pain after coughing which resolved in a day or so. She currently feels well without complaints.

## 2023-01-23 NOTE — PHYSICAL EXAM
[Respiratory Effort] : normal respiratory effort [Alert] : alert [Oriented to Person] : oriented to person [Oriented to Place] : oriented to place [Oriented to Time] : oriented to time [Calm] : calm [JVD] : no jugular venous distention  [Abdominal Masses] : No abdominal masses [Abdomen Tenderness] : ~T ~M No abdominal tenderness [de-identified] : JULIO KAY NAD [de-identified] : EOMI [de-identified] : soft NT, ND + reducible fat with small supraumbilical defect. <1cm

## 2023-02-01 ENCOUNTER — OUTPATIENT (OUTPATIENT)
Dept: OUTPATIENT SERVICES | Facility: HOSPITAL | Age: 38
LOS: 1 days | End: 2023-02-01

## 2023-02-01 VITALS
SYSTOLIC BLOOD PRESSURE: 102 MMHG | DIASTOLIC BLOOD PRESSURE: 71 MMHG | RESPIRATION RATE: 16 BRPM | HEIGHT: 67 IN | TEMPERATURE: 98 F | HEART RATE: 76 BPM | OXYGEN SATURATION: 98 % | WEIGHT: 123.9 LBS

## 2023-02-01 DIAGNOSIS — K42.9 UMBILICAL HERNIA WITHOUT OBSTRUCTION OR GANGRENE: ICD-10-CM

## 2023-02-01 DIAGNOSIS — Z98.891 HISTORY OF UTERINE SCAR FROM PREVIOUS SURGERY: Chronic | ICD-10-CM

## 2023-02-01 LAB
HCG SERPL-ACNC: <5 MIU/ML — SIGNIFICANT CHANGE UP
HCT VFR BLD CALC: 39.3 % — SIGNIFICANT CHANGE UP (ref 34.5–45)
HGB BLD-MCNC: 13.3 G/DL — SIGNIFICANT CHANGE UP (ref 11.5–15.5)
MCHC RBC-ENTMCNC: 31 PG — SIGNIFICANT CHANGE UP (ref 27–34)
MCHC RBC-ENTMCNC: 33.8 GM/DL — SIGNIFICANT CHANGE UP (ref 32–36)
MCV RBC AUTO: 91.6 FL — SIGNIFICANT CHANGE UP (ref 80–100)
NRBC # BLD: 0 /100 WBCS — SIGNIFICANT CHANGE UP (ref 0–0)
NRBC # FLD: 0 K/UL — SIGNIFICANT CHANGE UP (ref 0–0)
PLATELET # BLD AUTO: 264 K/UL — SIGNIFICANT CHANGE UP (ref 150–400)
RBC # BLD: 4.29 M/UL — SIGNIFICANT CHANGE UP (ref 3.8–5.2)
RBC # FLD: 12.1 % — SIGNIFICANT CHANGE UP (ref 10.3–14.5)
WBC # BLD: 6.23 K/UL — SIGNIFICANT CHANGE UP (ref 3.8–10.5)
WBC # FLD AUTO: 6.23 K/UL — SIGNIFICANT CHANGE UP (ref 3.8–10.5)

## 2023-02-01 NOTE — H&P PST ADULT - HAVE YOU RECEIVED AT LEAST TWO PFIZER AND/OR MODERNA VACCINATIONS (IN ANY COMBINATION) AND/OR ONE JOHNSON & JOHNSON VACCINATION?
----- Message from Rose Louis MD sent at 4/19/2017  4:17 PM CDT -----  Please let this pt know that her breast imaging was normal.  thanks  
Normal mammogram letter sent to patient.   
Yes

## 2023-02-01 NOTE — H&P PST ADULT - FUNCTIONAL STATUS
Activity: walks 3 miles a day, piliates classes  Symptoms: None/4-10 METS Activity: walks 3 miles a day, piliates classes     Symptoms: None/4-10 METS

## 2023-02-01 NOTE — H&P PST ADULT - PROBLEM SELECTOR PLAN 1
preop for umbilical hernia repair on 2/13/23  preop instructions given, pt verbalized understanding  GI prophylaxis and chlorhexidine wash provided  pt informed COVID testing must be done 72 hours prior to surgery  cbc, hcg pending

## 2023-02-01 NOTE — H&P PST ADULT - NSICDXPASTMEDICALHX_GEN_ALL_CORE_FT
PAST MEDICAL HISTORY:  Childhood asthma     Gestational diabetes     Umbilical hernia without obstruction or gangrene

## 2023-02-01 NOTE — H&P PST ADULT - ASSESSMENT
38 y/o female presents to PST preop for umbilical hernia repair. Pt reports she developed bulge after her 2nd pregnancy. h/o  section x2. Pt reports intermittent stabbing pain to area.

## 2023-02-01 NOTE — H&P PST ADULT - HISTORY OF PRESENT ILLNESS
36 y/o female presents to PST preop for umbilical hernia repair. Pt reports she developed bulge after her 2nd pregnancy. h/o  section x2. Pt reports intermittent stabbing pain to area.

## 2023-02-01 NOTE — H&P PST ADULT - GIT GEN HX ROS MEA POS PC
intermittent from umbilical hernia/abdominal pain intermittently from umbilical hernia/abdominal pain

## 2023-02-01 NOTE — H&P PST ADULT - MUSCULOSKELETAL
negative ROM intact/no calf tenderness/normal gait/strength 5/5 bilateral upper extremities/strength 5/5 bilateral lower extremities

## 2023-02-01 NOTE — H&P PST ADULT - GASTROINTESTINAL COMMENTS
preop dx of umbilical hernia without obstruction or gangrene. see HPI preop dx of umbilical hernia without obstruction of gangrene. see HPI

## 2023-02-09 PROBLEM — K42.9 UMBILICAL HERNIA WITHOUT OBSTRUCTION OR GANGRENE: Chronic | Status: ACTIVE | Noted: 2023-02-01

## 2023-02-09 PROBLEM — J45.909 UNSPECIFIED ASTHMA, UNCOMPLICATED: Chronic | Status: ACTIVE | Noted: 2023-02-01

## 2023-02-09 PROBLEM — O24.419 GESTATIONAL DIABETES MELLITUS IN PREGNANCY, UNSPECIFIED CONTROL: Chronic | Status: ACTIVE | Noted: 2023-02-01

## 2023-02-10 VITALS
RESPIRATION RATE: 16 BRPM | DIASTOLIC BLOOD PRESSURE: 66 MMHG | HEIGHT: 67 IN | HEART RATE: 64 BPM | SYSTOLIC BLOOD PRESSURE: 112 MMHG | TEMPERATURE: 99 F | WEIGHT: 123.9 LBS | OXYGEN SATURATION: 100 %

## 2023-02-10 NOTE — ASU PREOPERATIVE ASSESSMENT, ADULT (IPARK ONLY) - NPO AFTER
23:00
Bed in lowest position, wheels locked, appropriate side rails in place/Call bell, personal items and telephone in reach/Instruct patient to call for assistance before getting out of bed or chair/Non-slip footwear when patient is out of bed/[place] to call system/Physically safe environment - no spills, clutter or unnecessary equipment/Purposeful Proactive Rounding/Room/bathroom lighting operational, light cord in reach

## 2023-02-12 ENCOUNTER — TRANSCRIPTION ENCOUNTER (OUTPATIENT)
Age: 38
End: 2023-02-12

## 2023-02-13 ENCOUNTER — APPOINTMENT (OUTPATIENT)
Dept: SURGERY | Facility: AMBULATORY SURGERY CENTER | Age: 38
End: 2023-02-13

## 2023-02-13 ENCOUNTER — OUTPATIENT (OUTPATIENT)
Dept: OUTPATIENT SERVICES | Facility: HOSPITAL | Age: 38
LOS: 1 days | Discharge: HOME | End: 2023-02-13
Payer: COMMERCIAL

## 2023-02-13 ENCOUNTER — TRANSCRIPTION ENCOUNTER (OUTPATIENT)
Age: 38
End: 2023-02-13

## 2023-02-13 VITALS
SYSTOLIC BLOOD PRESSURE: 112 MMHG | TEMPERATURE: 98 F | HEART RATE: 64 BPM | RESPIRATION RATE: 12 BRPM | OXYGEN SATURATION: 99 % | DIASTOLIC BLOOD PRESSURE: 56 MMHG

## 2023-02-13 DIAGNOSIS — Z98.891 HISTORY OF UTERINE SCAR FROM PREVIOUS SURGERY: Chronic | ICD-10-CM

## 2023-02-13 DIAGNOSIS — K42.9 UMBILICAL HERNIA WITHOUT OBSTRUCTION OR GANGRENE: ICD-10-CM

## 2023-02-13 PROCEDURE — 49613 RPR AA HRN RCR < 3 RDC: CPT

## 2023-02-13 NOTE — ASU DISCHARGE PLAN (ADULT/PEDIATRIC) - CARE PROVIDER_API CALL
Rashaad Nelson)  ColonRectal Surgery; Surgery  [address] [place] [address]  Phone: [phone]  Fax: [phone]  Follow Up Time:

## 2023-02-13 NOTE — ASU DISCHARGE PLAN (ADULT/PEDIATRIC) - NS MD DC FALL RISK RISK
For information on Fall & Injury Prevention, visit: [email or web address] OR  [email or web address] OR  https://www.cdc.gov/steadi/patient.html

## 2023-02-13 NOTE — ASU DISCHARGE PLAN (ADULT/PEDIATRIC) - D. IF YOU HAD ANY TYPE OF SEDATION, YOU MAY EXPERIENCE LIGHTHEADEDNESS, DIZZINESS, OR SLEEPINESS FOLLOWING YOUR PROCEDURE. A RESPONSIBLE ADULT SHOULD STAY WITH YOU FOR AT LEAST 24 HOURS FOLLOWING YOUR PROCEDURE.
I called and discussed results with her.  Please mail her the copy of the report   Statement Selected

## 2023-02-13 NOTE — ASU DISCHARGE PLAN (ADULT/PEDIATRIC) - ASU DC SPECIAL INSTRUCTIONSFT
WOUND CARE: Please do not remove your steri-strips.    PAIN MEDICATION: Tylenol 650 mg 1 tablet (which is equal to 2 tablets of Tylenol 325 mg) every 6 hours for pain. NO more than 4 doses over 24 hours.    BATHING: Please do not submerge wound underwater. You may remove the superficial dressing and shower and/or sponge bathe at 24 hours after discharge.    ACTIVITY: No heavy lifting of anything more than 10-15 pounds or anything that will cause straining. Otherwise, you may return to your usual level of physical activity. If you are taking narcotic pain medication (such as Percocet or Oxycodone), do NOT drive a car, operate machinery, or make important decisions.     DIET: Please return to your usual diet.    NOTIFY YOUR SURGEON: If you have any bleeding that does not stop, any pus draining from your wound, any fever (over 100.4) or chills, persistent nausea/vomiting with inability to tolerate food or liquids, persistent diarrhea, or if your pain is not controlled on your discharge pain medications.     FOLLOW-UP:   1. Please make a follow-up appointment with Dr. Nelson within 1-2 weeks of discharge.   2. Please follow-up with your primary care physician in 1 week regarding your hospitalization.

## 2023-02-27 ENCOUNTER — APPOINTMENT (OUTPATIENT)
Dept: SURGERY | Facility: CLINIC | Age: 38
End: 2023-02-27
Payer: COMMERCIAL

## 2023-02-27 VITALS
BODY MASS INDEX: 20.09 KG/M2 | HEART RATE: 59 BPM | TEMPERATURE: 97.9 F | SYSTOLIC BLOOD PRESSURE: 111 MMHG | WEIGHT: 125 LBS | HEIGHT: 66 IN | DIASTOLIC BLOOD PRESSURE: 78 MMHG

## 2023-02-27 PROCEDURE — 99024 POSTOP FOLLOW-UP VISIT: CPT

## 2023-02-27 NOTE — ASSESSMENT
[FreeTextEntry1] : Patient is well, has no complaints.\par \par \par incision is c/d/i and healing well.\par \par \par \par f/u prn

## 2024-02-09 ENCOUNTER — NON-APPOINTMENT (OUTPATIENT)
Age: 39
End: 2024-02-09

## 2025-05-15 ENCOUNTER — OFFICE (OUTPATIENT)
Dept: URBAN - METROPOLITAN AREA CLINIC 27 | Facility: CLINIC | Age: 40
Setting detail: OPHTHALMOLOGY
End: 2025-05-15
Payer: COMMERCIAL

## 2025-05-15 DIAGNOSIS — H01.002: ICD-10-CM

## 2025-05-15 DIAGNOSIS — H01.001: ICD-10-CM

## 2025-05-15 DIAGNOSIS — H01.004: ICD-10-CM

## 2025-05-15 DIAGNOSIS — H00.11: ICD-10-CM

## 2025-05-15 PROBLEM — H57.02 ANISOCORIA IRREGULAR PUPIL: Status: ACTIVE | Noted: 2025-05-15

## 2025-05-15 PROBLEM — H01.005 BLEPHARITIS; RIGHT UPPER LID, RIGHT LOWER LID, LEFT UPPER LID, LEFT LOWER LID: Status: ACTIVE | Noted: 2025-05-15

## 2025-05-15 PROCEDURE — 99203 OFFICE O/P NEW LOW 30 MIN: CPT | Performed by: OPHTHALMOLOGY

## 2025-05-15 ASSESSMENT — REFRACTION_AUTOREFRACTION
OD_AXIS: 104
OD_SPHERE: -4.25
OS_CYLINDER: +0.25
OD_CYLINDER: +0.50
OS_SPHERE: -1.75
OS_AXIS: 49

## 2025-05-15 ASSESSMENT — KERATOMETRY
METHOD_AUTO_MANUAL: AUTO
OD_K2POWER_DIOPTERS: 46.00
OD_K1POWER_DIOPTERS: 45.00
OS_AXISANGLE_DEGREES: 89
OS_K1POWER_DIOPTERS: 45.00
OS_K2POWER_DIOPTERS: 45.75
OD_AXISANGLE_DEGREES: 89

## 2025-05-15 ASSESSMENT — VISUAL ACUITY
OD_BCVA: 20/20-1
OS_BCVA: 20/20

## 2025-05-15 ASSESSMENT — LID EXAM ASSESSMENTS
OS_BLEPHARITIS: LLL LUL
OD_BLEPHARITIS: RLL RUL

## 2025-05-15 ASSESSMENT — TONOMETRY
OS_IOP_MMHG: 16
OD_IOP_MMHG: 17

## (undated) DEVICE — DRSG TEGADERM 4X4.75"

## (undated) DEVICE — ELCTR ROCKER SWITCH PENCIL BLUE 10FT

## (undated) DEVICE — DRSG STERISTRIPS 0.5 X 4"

## (undated) DEVICE — DRAPE LAPAROTOMY TRANSVERSE

## (undated) DEVICE — VENODYNE/SCD SLEEVE CALF MEDIUM

## (undated) DEVICE — PACK MINOR NO DRAPE

## (undated) DEVICE — SUT MONOCRYL 4-0 27" PS-2 UNDYED

## (undated) DEVICE — SUT PDS II 2-0 27" SH

## (undated) DEVICE — DRAIN PENROSE .5" X 18" LATEX

## (undated) DEVICE — SUT PDS II 0 27" CT-2

## (undated) DEVICE — DRAIN PENROSE 0.5X12" SILICONE

## (undated) DEVICE — SUT VICRYL 2-0 27" SH UNDYED

## (undated) DEVICE — ELCTR GROUNDING PAD ADULT COVIDIEN

## (undated) DEVICE — POSITIONER STRAP ARMBOARD VELCRO TS-30

## (undated) DEVICE — SUT VICRYL 3-0 27" SH UNDYED

## (undated) DEVICE — WARMING BLANKET UPPER ADULT

## (undated) DEVICE — SPONGE DISSECTOR PEANUT

## (undated) DEVICE — GLV 7.5 PROTEXIS (CREAM) NEU-THERA

## (undated) DEVICE — SUT VICRYL 2-0 18" TIES UNDYED

## (undated) DEVICE — DRSG TELFA 3 X 8

## (undated) DEVICE — SYR LUER LOK 20CC

## (undated) DEVICE — DRSG BENZOIN 0.6CC

## (undated) DEVICE — GOWN XL

## (undated) DEVICE — SOL IRR POUR NS 0.9% 500ML